# Patient Record
Sex: FEMALE | Race: WHITE | NOT HISPANIC OR LATINO | URBAN - METROPOLITAN AREA
[De-identification: names, ages, dates, MRNs, and addresses within clinical notes are randomized per-mention and may not be internally consistent; named-entity substitution may affect disease eponyms.]

---

## 2024-06-13 ENCOUNTER — HOSPITAL ENCOUNTER (EMERGENCY)
Facility: HOSPITAL | Age: 34
Discharge: HOME | End: 2024-06-14
Attending: EMERGENCY MEDICINE
Payer: COMMERCIAL

## 2024-06-13 ENCOUNTER — APPOINTMENT (OUTPATIENT)
Dept: RADIOLOGY | Facility: HOSPITAL | Age: 34
End: 2024-06-13
Payer: COMMERCIAL

## 2024-06-13 DIAGNOSIS — R11.0 NAUSEA: ICD-10-CM

## 2024-06-13 DIAGNOSIS — R10.9 FLANK PAIN: Primary | ICD-10-CM

## 2024-06-13 DIAGNOSIS — K59.00 CONSTIPATION, UNSPECIFIED CONSTIPATION TYPE: ICD-10-CM

## 2024-06-13 LAB
ALBUMIN SERPL BCP-MCNC: 4.4 G/DL (ref 3.4–5)
ALP SERPL-CCNC: 63 U/L (ref 33–110)
ALT SERPL W P-5'-P-CCNC: 14 U/L (ref 7–45)
ANION GAP SERPL CALC-SCNC: 11 MMOL/L (ref 10–20)
APPEARANCE UR: ABNORMAL
AST SERPL W P-5'-P-CCNC: 13 U/L (ref 9–39)
BASOPHILS # BLD AUTO: 0.03 X10*3/UL (ref 0–0.1)
BASOPHILS NFR BLD AUTO: 0.4 %
BILIRUB SERPL-MCNC: 0.2 MG/DL (ref 0–1.2)
BILIRUB UR STRIP.AUTO-MCNC: NEGATIVE MG/DL
BUN SERPL-MCNC: 12 MG/DL (ref 6–23)
CALCIUM SERPL-MCNC: 9.4 MG/DL (ref 8.6–10.3)
CHLORIDE SERPL-SCNC: 102 MMOL/L (ref 98–107)
CO2 SERPL-SCNC: 29 MMOL/L (ref 21–32)
COLOR UR: YELLOW
CREAT SERPL-MCNC: 0.69 MG/DL (ref 0.5–1.05)
EGFRCR SERPLBLD CKD-EPI 2021: >90 ML/MIN/1.73M*2
EOSINOPHIL # BLD AUTO: 0.09 X10*3/UL (ref 0–0.7)
EOSINOPHIL NFR BLD AUTO: 1.1 %
ERYTHROCYTE [DISTWIDTH] IN BLOOD BY AUTOMATED COUNT: 12.6 % (ref 11.5–14.5)
GLUCOSE SERPL-MCNC: 98 MG/DL (ref 74–99)
GLUCOSE UR STRIP.AUTO-MCNC: NORMAL MG/DL
HCT VFR BLD AUTO: 38.8 % (ref 36–46)
HGB BLD-MCNC: 12.9 G/DL (ref 12–16)
HOLD SPECIMEN: NORMAL
IMM GRANULOCYTES # BLD AUTO: 0.01 X10*3/UL (ref 0–0.7)
IMM GRANULOCYTES NFR BLD AUTO: 0.1 % (ref 0–0.9)
KETONES UR STRIP.AUTO-MCNC: NEGATIVE MG/DL
LACTATE SERPL-SCNC: 1.4 MMOL/L (ref 0.4–2)
LEUKOCYTE ESTERASE UR QL STRIP.AUTO: NEGATIVE
LIPASE SERPL-CCNC: 24 U/L (ref 9–82)
LYMPHOCYTES # BLD AUTO: 3.09 X10*3/UL (ref 1.2–4.8)
LYMPHOCYTES NFR BLD AUTO: 36.4 %
MCH RBC QN AUTO: 31.2 PG (ref 26–34)
MCHC RBC AUTO-ENTMCNC: 33.2 G/DL (ref 32–36)
MCV RBC AUTO: 94 FL (ref 80–100)
MONOCYTES # BLD AUTO: 0.53 X10*3/UL (ref 0.1–1)
MONOCYTES NFR BLD AUTO: 6.2 %
NEUTROPHILS # BLD AUTO: 4.74 X10*3/UL (ref 1.2–7.7)
NEUTROPHILS NFR BLD AUTO: 55.8 %
NITRITE UR QL STRIP.AUTO: NEGATIVE
NRBC BLD-RTO: 0 /100 WBCS (ref 0–0)
PH UR STRIP.AUTO: 8 [PH]
PLATELET # BLD AUTO: 303 X10*3/UL (ref 150–450)
POTASSIUM SERPL-SCNC: 3.8 MMOL/L (ref 3.5–5.3)
PROT SERPL-MCNC: 7.2 G/DL (ref 6.4–8.2)
PROT UR STRIP.AUTO-MCNC: NEGATIVE MG/DL
RBC # BLD AUTO: 4.13 X10*6/UL (ref 4–5.2)
RBC # UR STRIP.AUTO: NEGATIVE /UL
SODIUM SERPL-SCNC: 138 MMOL/L (ref 136–145)
SP GR UR STRIP.AUTO: 1.02
UROBILINOGEN UR STRIP.AUTO-MCNC: NORMAL MG/DL
WBC # BLD AUTO: 8.5 X10*3/UL (ref 4.4–11.3)

## 2024-06-13 PROCEDURE — 96361 HYDRATE IV INFUSION ADD-ON: CPT

## 2024-06-13 PROCEDURE — 85025 COMPLETE CBC W/AUTO DIFF WBC: CPT | Performed by: EMERGENCY MEDICINE

## 2024-06-13 PROCEDURE — 74177 CT ABD & PELVIS W/CONTRAST: CPT | Mod: FOREIGN READ | Performed by: RADIOLOGY

## 2024-06-13 PROCEDURE — 87086 URINE CULTURE/COLONY COUNT: CPT | Mod: STJLAB | Performed by: EMERGENCY MEDICINE

## 2024-06-13 PROCEDURE — 74177 CT ABD & PELVIS W/CONTRAST: CPT

## 2024-06-13 PROCEDURE — 96374 THER/PROPH/DIAG INJ IV PUSH: CPT | Mod: 59

## 2024-06-13 PROCEDURE — 2500000004 HC RX 250 GENERAL PHARMACY W/ HCPCS (ALT 636 FOR OP/ED): Performed by: STUDENT IN AN ORGANIZED HEALTH CARE EDUCATION/TRAINING PROGRAM

## 2024-06-13 PROCEDURE — 81003 URINALYSIS AUTO W/O SCOPE: CPT | Performed by: EMERGENCY MEDICINE

## 2024-06-13 PROCEDURE — 83605 ASSAY OF LACTIC ACID: CPT | Performed by: EMERGENCY MEDICINE

## 2024-06-13 PROCEDURE — 2550000001 HC RX 255 CONTRASTS: Performed by: EMERGENCY MEDICINE

## 2024-06-13 PROCEDURE — 36415 COLL VENOUS BLD VENIPUNCTURE: CPT | Performed by: EMERGENCY MEDICINE

## 2024-06-13 PROCEDURE — 80053 COMPREHEN METABOLIC PANEL: CPT | Performed by: EMERGENCY MEDICINE

## 2024-06-13 PROCEDURE — 83690 ASSAY OF LIPASE: CPT | Performed by: STUDENT IN AN ORGANIZED HEALTH CARE EDUCATION/TRAINING PROGRAM

## 2024-06-13 PROCEDURE — 99284 EMERGENCY DEPT VISIT MOD MDM: CPT | Mod: 25

## 2024-06-13 PROCEDURE — 96375 TX/PRO/DX INJ NEW DRUG ADDON: CPT

## 2024-06-13 RX ORDER — KETOROLAC TROMETHAMINE 15 MG/ML
15 INJECTION, SOLUTION INTRAMUSCULAR; INTRAVENOUS ONCE
Status: COMPLETED | OUTPATIENT
Start: 2024-06-13 | End: 2024-06-13

## 2024-06-13 RX ORDER — ONDANSETRON HYDROCHLORIDE 2 MG/ML
4 INJECTION, SOLUTION INTRAVENOUS ONCE
Status: COMPLETED | OUTPATIENT
Start: 2024-06-13 | End: 2024-06-13

## 2024-06-13 ASSESSMENT — LIFESTYLE VARIABLES
HAVE YOU EVER FELT YOU SHOULD CUT DOWN ON YOUR DRINKING: NO
EVER HAD A DRINK FIRST THING IN THE MORNING TO STEADY YOUR NERVES TO GET RID OF A HANGOVER: NO
TOTAL SCORE: 0
EVER FELT BAD OR GUILTY ABOUT YOUR DRINKING: NO
HAVE PEOPLE ANNOYED YOU BY CRITICIZING YOUR DRINKING: NO

## 2024-06-13 ASSESSMENT — COLUMBIA-SUICIDE SEVERITY RATING SCALE - C-SSRS
6. HAVE YOU EVER DONE ANYTHING, STARTED TO DO ANYTHING, OR PREPARED TO DO ANYTHING TO END YOUR LIFE?: NO
1. IN THE PAST MONTH, HAVE YOU WISHED YOU WERE DEAD OR WISHED YOU COULD GO TO SLEEP AND NOT WAKE UP?: NO
2. HAVE YOU ACTUALLY HAD ANY THOUGHTS OF KILLING YOURSELF?: NO

## 2024-06-13 ASSESSMENT — PAIN DESCRIPTION - ORIENTATION: ORIENTATION: RIGHT

## 2024-06-13 ASSESSMENT — PAIN - FUNCTIONAL ASSESSMENT: PAIN_FUNCTIONAL_ASSESSMENT: 0-10

## 2024-06-13 ASSESSMENT — PAIN SCALES - GENERAL: PAINLEVEL_OUTOF10: 8

## 2024-06-13 ASSESSMENT — PAIN DESCRIPTION - PROGRESSION: CLINICAL_PROGRESSION: GRADUALLY WORSENING

## 2024-06-13 ASSESSMENT — PAIN DESCRIPTION - PAIN TYPE: TYPE: ACUTE PAIN

## 2024-06-13 ASSESSMENT — PAIN DESCRIPTION - LOCATION: LOCATION: ABDOMEN

## 2024-06-13 ASSESSMENT — PAIN DESCRIPTION - DESCRIPTORS: DESCRIPTORS: SHARP;SHOOTING;ACHING

## 2024-06-13 ASSESSMENT — PAIN DESCRIPTION - FREQUENCY: FREQUENCY: CONSTANT/CONTINUOUS

## 2024-06-14 VITALS
TEMPERATURE: 97.2 F | BODY MASS INDEX: 25.76 KG/M2 | HEART RATE: 64 BPM | WEIGHT: 170 LBS | SYSTOLIC BLOOD PRESSURE: 115 MMHG | HEIGHT: 68 IN | DIASTOLIC BLOOD PRESSURE: 74 MMHG | RESPIRATION RATE: 16 BRPM | OXYGEN SATURATION: 99 %

## 2024-06-14 RX ORDER — POLYETHYLENE GLYCOL 3350 17 G/17G
17 POWDER, FOR SOLUTION ORAL DAILY
Qty: 3 PACKET | Refills: 0 | Status: SHIPPED | OUTPATIENT
Start: 2024-06-14 | End: 2024-06-17

## 2024-06-14 RX ORDER — ONDANSETRON 4 MG/1
4 TABLET, ORALLY DISINTEGRATING ORAL EVERY 8 HOURS PRN
Qty: 20 TABLET | Refills: 0 | Status: SHIPPED | OUTPATIENT
Start: 2024-06-14 | End: 2024-06-21

## 2024-06-14 ASSESSMENT — PAIN - FUNCTIONAL ASSESSMENT: PAIN_FUNCTIONAL_ASSESSMENT: 0-10

## 2024-06-14 ASSESSMENT — PAIN SCALES - GENERAL: PAINLEVEL_OUTOF10: 3

## 2024-06-14 NOTE — ED PROVIDER NOTES
EMERGENCY DEPARTMENT ENCOUNTER      Pt Name: Lindy Pascal  MRN: [id number]  Birthdate 1990  Date of evaluation: 6/13/2024  Provider: Bj Roblero DO    CHIEF COMPLAINT       Chief Complaint   Patient presents with    Flank Pain     Right sided flank pain started today radiates to front, N/V and pain with urination.  Hx of kidney stones         HISTORY OF PRESENT ILLNESS    HPI   34-year-old female with past medical history significant for prior kidney stones who presents to the emergency department with right-sided flank pain that started earlier this afternoon, radiates down to her front.  She states it is associated with nausea and vomiting and with chills, and endorses recent urinary cloudiness.  She said that she has had to have 2 prior interventions for her kidney stones in the past, and the most recent one was 2 years ago.  She does not have a gallbladder, and she does not have a uterus.          Nursing Notes were reviewed.       PAST MEDICAL HISTORY   Patient History   Past Medical History:   Diagnosis Date    Kidney stone     Personal history of other diseases of the nervous system and sense organs 09/18/2015    History of tension headache    Personal history of other diseases of the respiratory system     History of asthma    Personal history of pneumonia (recurrent)     History of pneumonia         SURGICAL HISTORY       Past Surgical History:   Procedure Laterality Date    CHOLECYSTECTOMY  05/01/2018    Cholecystectomy    TONSILLECTOMY  05/01/2018    Tonsillectomy         CURRENT MEDICATIONS       Discharge Medication List as of 6/14/2024 12:50 AM          ALLERGIES     Patient has no known allergies.    FAMILY HISTORY     No family history on file.       SOCIAL HISTORY       Social History     Socioeconomic History    Marital status: Legally      Spouse name: None    Number of children: None    Years of education: None    Highest education level: None   Occupational History     None   Tobacco Use    Smoking status: Every Day     Types: Cigarettes    Smokeless tobacco: Never   Substance and Sexual Activity    Alcohol use: Never    Drug use: Not Currently    Sexual activity: None   Other Topics Concern    None   Social History Narrative    None     Social Determinants of Health     Financial Resource Strain: Not on file   Food Insecurity: Not on file   Transportation Needs: Not on file   Physical Activity: Not on file   Stress: Not on file   Social Connections: Not on file   Intimate Partner Violence: Not on file   Housing Stability: Not on file       SCREENINGS                             PHYSICAL EXAM    (up to 7 for level 4, 8 or more for level 5)   Physical Exam   ED Triage Vitals [06/13/24 2036]   Temperature Heart Rate Respirations BP   36.6 °C (97.9 °F) 81 18 134/73      Pulse Ox Temp Source Heart Rate Source Patient Position   99 % Temporal Monitor Sitting      BP Location FiO2 (%)     Right arm --       Physical Exam  Vitals and nursing note reviewed.   Constitutional:       Appearance: She is well-developed.      Comments: Appears uncomfortable   HENT:      Head: Normocephalic and atraumatic.      Mouth/Throat:      Mouth: Mucous membranes are dry.   Eyes:      Conjunctiva/sclera: Conjunctivae normal.   Cardiovascular:      Rate and Rhythm: Normal rate and regular rhythm.      Heart sounds: No murmur heard.  Pulmonary:      Effort: Pulmonary effort is normal. No respiratory distress.      Breath sounds: Normal breath sounds.   Abdominal:      Palpations: Abdomen is soft.      Tenderness: There is no abdominal tenderness.   Musculoskeletal:         General: No swelling.      Cervical back: Neck supple.   Skin:     General: Skin is warm and dry.      Capillary Refill: Capillary refill takes less than 2 seconds.      Coloration: Skin is pale.   Neurological:      Mental Status: She is alert.   Psychiatric:         Mood and Affect: Mood normal.          DIAGNOSTIC RESULTS      LABS:  Labs Reviewed   URINALYSIS WITH REFLEX MICROSCOPIC - Abnormal       Result Value    Color, Urine Yellow      Appearance, Urine Ex.Turbid (*)     Specific Gravity, Urine 1.017      pH, Urine 8.0      Protein, Urine NEGATIVE      Glucose, Urine Normal      Blood, Urine NEGATIVE      Ketones, Urine NEGATIVE      Bilirubin, Urine NEGATIVE      Urobilinogen, Urine Normal      Nitrite, Urine NEGATIVE      Leukocyte Esterase, Urine NEGATIVE     URINE CULTURE - Normal    Urine Culture No significant growth     COMPREHENSIVE METABOLIC PANEL - Normal    Glucose 98      Sodium 138      Potassium 3.8      Chloride 102      Bicarbonate 29      Anion Gap 11      Urea Nitrogen 12      Creatinine 0.69      eGFR >90      Calcium 9.4      Albumin 4.4      Alkaline Phosphatase 63      Total Protein 7.2      AST 13      Bilirubin, Total 0.2      ALT 14     LACTATE - Normal    Lactate 1.4      Narrative:     Venipuncture immediately after or during the administration of Metamizole may lead to falsely low results. Testing should be performed immediately  prior to Metamizole dosing.   LIPASE - Normal    Lipase 24      Narrative:     Venipuncture immediately after or during the administration of Metamizole may lead to falsely low results. Testing should be performed immediately prior to Metamizole dosing.   CBC WITH AUTO DIFFERENTIAL    WBC 8.5      nRBC 0.0      RBC 4.13      Hemoglobin 12.9      Hematocrit 38.8      MCV 94      MCH 31.2      MCHC 33.2      RDW 12.6      Platelets 303      Neutrophils % 55.8      Immature Granulocytes %, Automated 0.1      Lymphocytes % 36.4      Monocytes % 6.2      Eosinophils % 1.1      Basophils % 0.4      Neutrophils Absolute 4.74      Immature Granulocytes Absolute, Automated 0.01      Lymphocytes Absolute 3.09      Monocytes Absolute 0.53      Eosinophils Absolute 0.09      Basophils Absolute 0.03         All other labs were within normal range or not returned as of this  dictation.    Imaging  CT abdomen pelvis w IV contrast   Final Result   1.Moderate amount fecal debris throughout the colon.   2.Very small amount free fluid in pelvis.   Signed by Dayton Kline DO              Procedures  Procedures     EMERGENCY DEPARTMENT COURSE/MDM:   Lindy Pascal is a 34 y.o. female presenting to the ED for evaluation of had concerns including Flank Pain (Right sided flank pain started today radiates to front, N/V and pain with urination.  Hx of kidney stones)..   Medical Decision Making  34-year-old female with past medical history significant for kidney stones who presents to the emergency department with right-sided flank pain that feels like her prior kidney stones.  Urinalysis is clear however.  She was given Toradol and Zofran and IV fluid bolus, and remainder of her Laboratory studies show no significant electrolyte derangements, normal kidney function, and preserved liver function.  She has no leukocytosis, no signs of anemia, no thrombocytopenia.  Urinalysis is pristine.  However sent for culture CT of the abdomen pelvis shows no hydronephrosis or perinephric stranding and no obvious stones.     Signed out to the oncoming provider pending final CT read and disposition.        ED Course as of 06/20/24 2315   Thu Jun 13, 2024   [id number] Received sign-out from Dr. García pending CT a/p and anticipate discharge. [ES]      ED Course User Index  [ES] Brandon Wu MD         Diagnoses as of 06/20/24 2315   Flank pain   Constipation, unspecified constipation type   Nausea          External records reviewed: I reviewed external records including outpatient, PCP records, and prior discharge summaries    I have reviewed this case with the ED attending physician, and the attending agrees with the plan. Patient or family was counselled regarding labs, imaging, likely diagnosis, and plan. All questions were answered.     Keya García DO  PGY-4, emergency medicine    The above documentation was  completed with the use of speech recognition software. It may contain dictation errors secondary to limitations of the software.      ED Medications administered this visit:    Medications   ondansetron (Zofran) injection 4 mg (4 mg intravenous Given 6/13/24 2159)   ketorolac (Toradol) injection 15 mg (15 mg intravenous Given 6/13/24 2159)   sodium chloride 0.9 % bolus 1,000 mL (0 mL intravenous Stopped 6/13/24 2201)   iohexol (OMNIPaque) 350 mg iodine/mL solution 75 mL (75 mL intravenous Given 6/13/24 2217)       New Prescriptions from this visit:    Discharge Medication List as of 6/14/2024 12:50 AM        START taking these medications    Details   ondansetron ODT (Zofran-ODT) 4 mg disintegrating tablet Take 1 tablet (4 mg) by mouth every 8 hours if needed for nausea or vomiting for up to 7 days., Starting Fri 6/14/2024, Until Fri 6/21/2024 at 2359, Normal      polyethylene glycol (Glycolax, Miralax) 17 gram packet Take 17 g by mouth once daily for 3 days., Starting Fri 6/14/2024, Until Mon 6/17/2024, Normal             Final Impression:   1. Flank pain    2. Constipation, unspecified constipation type    3. Nausea          (Please note that portions of this note were completed with a voice recognition program.  Efforts were made to edit the dictations but occasionally words are mis-transcribed.)     Keya García DO  Resident  06/14/24 0142  The patient was seen by the resident/fellow.  I have personally performed a substantive portion of the encounter.  I have seen and examined the patient; agree with the workup, evaluation, MDM, management and diagnosis.  The care plan has been discussed with the resident; I have reviewed the resident’s note and agree with the documented findings.       Bj Roblero DO  06/20/24 1752

## 2024-06-14 NOTE — PROGRESS NOTES
Emergency Medicine Transition of Care Note.    I received Lindy Pascal in signout from Dr. García.  Please see the previous ED provider note for all HPI, PE and MDM up to the time of signout at 2330. This is in addition to the primary record.    In brief Lindy Pascal is an 34 y.o. female presenting for   Chief Complaint   Patient presents with    Flank Pain     Right sided flank pain started today radiates to front, N/V and pain with urination.  Hx of kidney stones     At the time of signout we were awaiting: CT a/p    ED Course as of 06/14/24 0048   Thu Jun 13, 2024   [id number] Received sign-out from Dr. García pending CT a/p and anticipate discharge. [ES]      ED Course User Index  [ES] Brandon Wu MD         Diagnoses as of 06/14/24 0048   Flank pain   Constipation, unspecified constipation type   Nausea       Medical Decision Making  34-year-old female presenting to the ED with acute onset right flank pain since this morning with nausea and vomiting.  Patient is afebrile, hemodynamically stable on room air, and in no acute distress.  We are currently awaiting CT abdomen pelvis which returned revealing moderate constipation but otherwise no stranding to suggest inflammation or stone.  Patient updated on findings and reassured.  Request for prescription of Zofran which is prescribed.  MiraLAX provided for the next 3 days.  Strict return precautions given.        Final diagnoses:   [R10.9] Flank pain   [K59.00] Constipation, unspecified constipation type   [R11.0] Nausea           Procedure  Procedures    Brandon Wu MD

## 2024-06-14 NOTE — DISCHARGE INSTRUCTIONS
Seek immediate medical attention should you have:  fevers, shortness of breath, chest pain, weakness, confusion, vomiting, or any worsening or concerning symptoms.

## 2024-06-15 LAB — BACTERIA UR CULT: NORMAL
